# Patient Record
Sex: FEMALE | Race: WHITE | NOT HISPANIC OR LATINO | Employment: FULL TIME | URBAN - METROPOLITAN AREA
[De-identification: names, ages, dates, MRNs, and addresses within clinical notes are randomized per-mention and may not be internally consistent; named-entity substitution may affect disease eponyms.]

---

## 2023-02-01 ENCOUNTER — TRANSFERRED RECORDS (OUTPATIENT)
Dept: MULTI SPECIALTY CLINIC | Facility: CLINIC | Age: 43
End: 2023-02-01

## 2023-02-01 LAB — PAP SMEAR - HIM PATIENT REPORTED: NORMAL

## 2024-04-22 ENCOUNTER — OFFICE VISIT (OUTPATIENT)
Dept: FAMILY MEDICINE | Facility: CLINIC | Age: 44
End: 2024-04-22
Payer: COMMERCIAL

## 2024-04-22 ENCOUNTER — TELEPHONE (OUTPATIENT)
Dept: PLASTIC SURGERY | Facility: CLINIC | Age: 44
End: 2024-04-22

## 2024-04-22 ENCOUNTER — TELEPHONE (OUTPATIENT)
Dept: NEUROLOGY | Facility: CLINIC | Age: 44
End: 2024-04-22

## 2024-04-22 VITALS
HEART RATE: 105 BPM | WEIGHT: 126 LBS | OXYGEN SATURATION: 98 % | BODY MASS INDEX: 19.78 KG/M2 | RESPIRATION RATE: 16 BRPM | HEIGHT: 67 IN | TEMPERATURE: 98 F | DIASTOLIC BLOOD PRESSURE: 72 MMHG | SYSTOLIC BLOOD PRESSURE: 102 MMHG

## 2024-04-22 DIAGNOSIS — Z00.00 VISIT FOR PREVENTIVE HEALTH EXAMINATION: Primary | ICD-10-CM

## 2024-04-22 DIAGNOSIS — G45.9 TRANSIENT ISCHEMIC ATTACK (TIA): ICD-10-CM

## 2024-04-22 DIAGNOSIS — H91.90 HEARING LOSS, UNSPECIFIED HEARING LOSS TYPE, UNSPECIFIED LATERALITY: ICD-10-CM

## 2024-04-22 DIAGNOSIS — Z13.0 SCREENING FOR DEFICIENCY ANEMIA: ICD-10-CM

## 2024-04-22 DIAGNOSIS — Z13.29 SCREENING FOR THYROID DISORDER: ICD-10-CM

## 2024-04-22 DIAGNOSIS — Z13.220 LIPID SCREENING: ICD-10-CM

## 2024-04-22 DIAGNOSIS — Z13.1 SCREENING FOR DIABETES MELLITUS: ICD-10-CM

## 2024-04-22 DIAGNOSIS — F64.9 GENDER DYSPHORIA: ICD-10-CM

## 2024-04-22 LAB
ANION GAP SERPL CALCULATED.3IONS-SCNC: 10 MMOL/L (ref 7–15)
BUN SERPL-MCNC: 10 MG/DL (ref 6–20)
CALCIUM SERPL-MCNC: 9 MG/DL (ref 8.6–10)
CHLORIDE SERPL-SCNC: 105 MMOL/L (ref 98–107)
CHOLEST SERPL-MCNC: 198 MG/DL
CREAT SERPL-MCNC: 0.81 MG/DL (ref 0.51–1.17)
DEPRECATED HCO3 PLAS-SCNC: 24 MMOL/L (ref 22–29)
EGFRCR SERPLBLD CKD-EPI 2021: >90 ML/MIN/1.73M2
ERYTHROCYTE [DISTWIDTH] IN BLOOD BY AUTOMATED COUNT: 11.3 % (ref 10–15)
FASTING STATUS PATIENT QL REPORTED: YES
GLUCOSE SERPL-MCNC: 88 MG/DL (ref 70–99)
HCT VFR BLD AUTO: 39.4 % (ref 35–53)
HDLC SERPL-MCNC: 93 MG/DL
HGB BLD-MCNC: 13.5 G/DL (ref 11.7–17.7)
LDLC SERPL CALC-MCNC: 96 MG/DL
MCH RBC QN AUTO: 32.5 PG (ref 26.5–33)
MCHC RBC AUTO-ENTMCNC: 34.3 G/DL (ref 31.5–36.5)
MCV RBC AUTO: 95 FL (ref 78–100)
NONHDLC SERPL-MCNC: 105 MG/DL
PLATELET # BLD AUTO: 267 10E3/UL (ref 150–450)
POTASSIUM SERPL-SCNC: 4.5 MMOL/L (ref 3.4–5.3)
RBC # BLD AUTO: 4.15 10E6/UL (ref 3.8–5.9)
SODIUM SERPL-SCNC: 139 MMOL/L (ref 135–145)
TRIGL SERPL-MCNC: 47 MG/DL
TSH SERPL DL<=0.005 MIU/L-ACNC: 0.86 UIU/ML (ref 0.3–4.2)
WBC # BLD AUTO: 6.4 10E3/UL (ref 4–11)

## 2024-04-22 PROCEDURE — 90715 TDAP VACCINE 7 YRS/> IM: CPT | Performed by: FAMILY MEDICINE

## 2024-04-22 PROCEDURE — 85027 COMPLETE CBC AUTOMATED: CPT | Performed by: FAMILY MEDICINE

## 2024-04-22 PROCEDURE — 36415 COLL VENOUS BLD VENIPUNCTURE: CPT | Performed by: FAMILY MEDICINE

## 2024-04-22 PROCEDURE — 99213 OFFICE O/P EST LOW 20 MIN: CPT | Mod: 25 | Performed by: FAMILY MEDICINE

## 2024-04-22 PROCEDURE — 84443 ASSAY THYROID STIM HORMONE: CPT | Performed by: FAMILY MEDICINE

## 2024-04-22 PROCEDURE — 80048 BASIC METABOLIC PNL TOTAL CA: CPT | Performed by: FAMILY MEDICINE

## 2024-04-22 PROCEDURE — 90471 IMMUNIZATION ADMIN: CPT | Performed by: FAMILY MEDICINE

## 2024-04-22 PROCEDURE — 80061 LIPID PANEL: CPT | Performed by: FAMILY MEDICINE

## 2024-04-22 PROCEDURE — 99386 PREV VISIT NEW AGE 40-64: CPT | Mod: 25 | Performed by: FAMILY MEDICINE

## 2024-04-22 RX ORDER — ATORVASTATIN CALCIUM 20 MG/1
20 TABLET, FILM COATED ORAL DAILY
Qty: 90 TABLET | Refills: 3 | Status: SHIPPED | OUTPATIENT
Start: 2024-04-22

## 2024-04-22 RX ORDER — ASPIRIN 81 MG/1
81 TABLET ORAL DAILY
COMMUNITY

## 2024-04-22 SDOH — HEALTH STABILITY: PHYSICAL HEALTH: ON AVERAGE, HOW MANY DAYS PER WEEK DO YOU ENGAGE IN MODERATE TO STRENUOUS EXERCISE (LIKE A BRISK WALK)?: 0 DAYS

## 2024-04-22 SDOH — HEALTH STABILITY: PHYSICAL HEALTH: ON AVERAGE, HOW MANY MINUTES DO YOU ENGAGE IN EXERCISE AT THIS LEVEL?: 0 MIN

## 2024-04-22 ASSESSMENT — PAIN SCALES - GENERAL: PAINLEVEL: NO PAIN (0)

## 2024-04-22 ASSESSMENT — SOCIAL DETERMINANTS OF HEALTH (SDOH): HOW OFTEN DO YOU GET TOGETHER WITH FRIENDS OR RELATIVES?: ONCE A WEEK

## 2024-04-22 NOTE — PROGRESS NOTES
Assessment/Plan.    Preventive.  See below orders for screening tests and immunizations.  -Declines mammogram    Episodes of blurry vision, single episode of abnormal speech.  Diagnosed as TIA in [place], though character of episodes seems atypical for this.  Could be complex migraine.  -Continue aspirin and statin  -Refer to neurology.  I think patient would benefit from review of symptoms and previous imaging by neurologist.  If TIA/stroke, would likely benefit from additional cardiac workup    Gender dysphoria.  Interested in top surgery.  -Refer to plastic surgery    Bilateral hearing loss.  Reassuring ear exam.  -Refer to audiology    Follow-up annually.    Orders Placed This Encounter   Procedures    TDAP 7+ (ADACEL,BOOSTRIX)    Lipid panel reflex to direct LDL Fasting    Basic metabolic panel  (Ca, Cl, CO2, Creat, Gluc, K, Na, BUN)    CBC with platelets    TSH with free T4 reflex    Adult Neurology  Referral    Adult Plastic Surgery  Referral    Adult Audiology  Referral       ----  Chief complaint: Physical (Neuro ref requested)    Social History     Social History Narrative    -Grew up in [place].  Moved to [place] in 8/2023.    -Lives with female partner    -No kids    -Works remotely as [employer]     -Lived in [place] for 11 years    -Enjoys  [employer], board games, [employer]        Updated 4/23/2024     Patient has been advised of split billing: yes.    Neurologic episode in July 2021.  Was living in [facility] at the time.  Episode occurred soon after second dose of COVID vaccine.    Patient noted blurring of words on computer screen.  Then noted ability to read letters, but could not make sense of words.  Was able to speak, but used nonsensical words.  Patient cannot recall whether she was able to understand the speech of others during this time.  Episode lasted for less than an hour.    Patient has experienced 5 additional episodes since that time.  The most recent episode occurred in  "August 2023.  During these later episodes, patient has not noted any speech deficits.  The episodes typically involve blurry vision.  They last about 20 minutes.    No persistent deficits following episodes.    Patient denies experiencing headaches during these episodes.  She denies a history of migraines.    No history of DVT.    Following initial episode in July 2021, lesion was noted on brain MRI. Subsequent MRIs showed improvement in lesion.    Following initial episode, had EKG.  Does not recall rhythm monitor or echocardiogram.  Had carotid ultrasound.  Had ophthalmology consult.    Following initial episode, was started on aspirin 100 mg daily.  Has been taking 81 mg daily recently.  Was also started on Lipitor 20 mg daily, but ran out of this more than 6 months ago.      With regard to preventive care, denies STI concerns.  Denies pregnancy risk.  Patient reports being advised to not receive any further COVID vaccines due to possible TIA following second dose.  Patient feels that she received usual childhood vaccines in [place].  She had an abnormal Pap smear in her 20s, but subsequent Pap smears were normal.  Most recent Pap smear was about a year ago in [place].    Very occasionally smokes a cigarette.  Currently using nicotine patch.    Bilateral hearing loss.  Past 5 years.  More pronounced on right.  Most noticeable in noisy areas.  No recent tinnitus or loss of balance.  No history of ear surgery.  No family history of early-onset deafness.  No recent ear trauma.    Exam  /72 (BP Location: Right arm, Patient Position: Sitting, Cuff Size: Adult Regular)   Pulse 105   Temp 98  F (36.7  C) (Temporal)   Resp 16   Ht 1.7 m (5' 6.93\")   Wt 57.2 kg (126 lb)   SpO2 98%   BMI 19.78 kg/m      Tympanic membranes normal bilaterally  oropharynx without abnormal masses  No carotid bruit  lung fields CTAB  heart with regular rate and rhythm, no murmurs  no lower leg edema    PERRL, EOMI  Symmetric " facial movements  Symmetric sensation to light touch on face  Midline tongue protrusion and uvula elevation  Symmetric shoulder shrug    No pronator drift. 5/5 strength b/l with flexion/extension at elbows and wrists. Symmetric  strength.    5/5 strength b/l with flexion at hip, flexion & extension at knee, and dorsiflexion & plantarflexion at ankle

## 2024-04-22 NOTE — COMMUNITY RESOURCES LIST (ENGLISH)
April 22, 2024           YOUR PERSONALIZED LIST OF SERVICES & PROGRAMS           & RECREATION    Sports      [facility] [employer] - Walking Group  [address] [place] [address] (Distance: 1.3 miles)  Phone: [phone]  Language: English  Fee: Free  Accessibility: Ada accessible, Translation services      [place] [employer] - Sports clubs and recreational activities - [facility]  [address] [place] [address] (Distance: 1.5 miles)  Phone: [phone]  Language: English, Thai, Welsh  Fee: Free, Self pay  Accessibility: Ada accessible      [facility] - Adult Enrichment  Phone: [phone]  Website: [email or web address]  Language: English  Hours: Mon 7:30 AM - 4:00 PM Tue 7:30 AM - 4:00 PM Wed 7:30 AM - 4:00 PM Thu 7:30 AM - 4:00 PM Fri 7:30 AM - 4:00 PM    Classes/Groups      [facility] - Gym or workout facility  [address] [place] [address] (Distance: 3.4 miles)  Phone: [phone]  Language: English, Thai  Fee: Self pay  Accessibility: Ada accessible      [facility] Gym or workout [facility] - [facility]  [address] [place] [address] (Distance: 0.3 miles)  Language: English  Fee: Free, Self pay  Accessibility: Ada accessible      [facility] - Adult Enrichment  Phone: [phone]  Website: [email or web address]-[email or web address]  Language: English  Hours: Mon 7:30 AM - 4:00 PM Tue 7:30 AM - 4:00 PM Wed 7:30 AM - 4:00 PM Thu 7:30 AM - 4:00 PM Fri 7:30 AM - 4:00 PM               IMPORTANT NUMBERS & WEBSITES        Emergency Services  911  .   United Way  211 http://211unitedway.org  .   Poison Control  (325) 415-7787 http://[place]poison.org http://wis[place]poison.org  .     Suicide and  Crisis Lifeline  988 http://988lifeline.org  .   Childhelp [place] Child Abuse Hotline  805.697.8225 http://Childhelphotline.org   .   National Sexual Assault Hotline  (427) 395-7326 (HOPE) http://Rainn.org   .     National Runaway Safeline  (867) 148-5845 (RUNAWAY) http://HelloBooks.Moovweb  .   Pregnancy & Postpartum Support  Call/text 525-792-9003  [place]: http://ppsupport[place].org  [place]: http://psichapters.com/wi  .   Substance Abuse National Helpline ([facility])  731-235-HELP (4846) http://Findtreatment.gov   .                DISCLAIMER: These resources have been generated via the [employer] Platform. [employer] does not endorse any service providers mentioned in this resource list. [employer] does not guarantee that the services mentioned in this resource list will be available to you or will improve your health or wellness.    [facility]

## 2024-04-22 NOTE — PROGRESS NOTES
Preventive Care Visit  [facility] INTEGRATED PRIMARY CARE  Thaddeus Gutierrez MD, Family Medicine  Apr 22, 2024  {Provider  Link to SmartSet :466096}    {PROVIDER CHARTING PREFERENCE:749848}    Subjective   AJ is a 43 year old, presenting for the following:  Physical (Neuro ref requested)        4/22/2024    10:11 AM   Additional Questions   Roomed by [facility]        Via the Health Maintenance questionnaire, the patient has reported the following services have been completed -Cervical Cancer Screening, this information has been sent to the abstraction team.  Health Care Directive  Patient does not have a Health Care Directive or Living Will: {ADVANCE_DIRECTIVE_STATUS:320673}    HPI  ***  {MA/LPN/RN Pre-Provider Visit Orders- hCG/UA/Strep (Optional):580764}  {SUPERLIST (Optional):538552}  {additonal problems for provider to add (Optional):668610}        4/22/2024   General Health   How would you rate your overall physical health? Good   Feel stress (tense, anxious, or unable to sleep) Only a little   (!) STRESS CONCERN      4/22/2024   Nutrition   Three or more servings of calcium each day? Yes   Diet: Regular (no restrictions)   How many servings of fruit and vegetables per day? (!) 2-3   How many sweetened beverages each day? (!) 2         4/22/2024   Exercise   Days per week of moderate/strenous exercise 0 days   Average minutes spent exercising at this level 0 min   (!) EXERCISE CONCERN      4/22/2024   Social Factors   Frequency of gathering with friends or relatives Once a week   Worry food won't last until get money to buy more No   Food not last or not have enough money for food? No   Do you have housing?  Yes   Are you worried about losing your housing? No   Lack of transportation? No   Unable to get utilities (heat,electricity)? No         4/22/2024   Dental   Dentist two times every year? (!) NO         4/22/2024   TB Screening   Were you born outside of the [place]? No         Today's PHQ-2  Score:       4/22/2024     9:08 AM   PHQ-2 ( 1999 Pfizer)   Q1: Little interest or pleasure in doing things 0   Q2: Feeling down, depressed or hopeless 0   PHQ-2 Score 0   Q1: Little interest or pleasure in doing things Not at all   Q2: Feeling down, depressed or hopeless Not at all   PHQ-2 Score 0           4/22/2024   Substance Use   Alcohol more than 3/day or more than 7/wk Yes   How often do you have a drink containing alcohol 4 or more times a week   How many alcohol drinks on typical day 1 or 2   How often do you have 5+ drinks at one occasion Never   Audit 2/3 Score 0   How often not able to stop drinking once started Never   How often failed to do what normally expected Never   How often needed first drink in am after a heavy drinking session Never   How often feeling of guilt or remorse after drinking Never   How often unable to remember what happened the night before Never   Have you or someone else been injured because of your drinking No   Has anyone been concerned or suggested you cut down on drinking No   TOTAL SCORE - AUDIT 4   Do you use any other substances recreationally? (!) ALCOHOL    (!) CANNABIS PRODUCTS     Social History     Tobacco Use    Smoking status: Never    Smokeless tobacco: Never     {Provider  If there are gaps in the social history shown above, please follow the link to update and then refresh the note Link to Social and Substance History :747530}        4/22/2024   Breast Cancer Screening   Family history of breast, colon, or ovarian cancer? Yes         4/22/2024   LAST FHS-7 RESULTS   1st degree relative breast or ovarian cancer No   Any relative bilateral breast cancer No   Any male have breast cancer No   Any ONE woman have BOTH breast AND ovarian cancer No   Any woman with breast cancer before 50yrs No   2 or more relatives with breast AND/OR ovarian cancer No   2 or more relatives with breast AND/OR bowel cancer No     {If any of the questions to the FHS7 are answered yes,  "consider referral for genetic counseling.    Additional indications for genetic referral include personal history of breast or ovarian cancer, genetic mutation in 1st degree relative which increases risk of breast cancer including BRCA1, BRCA2, ANTONY, PALB 2, TP53, CHEK2, PTEN, CDH1, STK11 (per ACS) and/or 1st degree relative with history of pancreatic or high-risk prostate cancer (per NCCN):930787}   {Mammogram Decision Support (Optional):618769}          4/22/2024   One time HIV Screening   Previous HIV test? Yes         4/22/2024   STI Screening   New sexual partner(s) since last STI/HIV test? No     History of abnormal Pap smear: { :427806}       ASCVD Risk   The ASCVD Risk score (Surjit DIOR, et al., 2019) failed to calculate for the following reasons:    Cannot find a previous HDL lab    Cannot find a previous total cholesterol lab    The BP treatment status is invalid        4/22/2024   Contraception/Family Planning   Questions about contraception or family planning No     {Provider  Use the storyboard to review patient history, after sections have been marked as reviewed, refresh note to capture documentation:356514}   Reviewed and updated as needed this visit by Provider                    {HISTORY OPTIONS (Optional):974854}    {ROS Picklists (Optional):297508}     Objective    Exam  /72 (BP Location: Right arm, Patient Position: Sitting, Cuff Size: Adult Regular)   Pulse 105   Temp 98  F (36.7  C) (Temporal)   Resp 16   Ht 1.7 m (5' 6.93\")   Wt 57.2 kg (126 lb)   SpO2 98%   BMI 19.78 kg/m     Estimated body mass index is 19.78 kg/m  as calculated from the following:    Height as of this encounter: 1.7 m (5' 6.93\").    Weight as of this encounter: 57.2 kg (126 lb).    Physical Exam  {Exam Choices (Optional):087214}        Signed Electronically by: Thaddeus Gutierrez MD  {Email feedback regarding this note to [email or web address]   :503535}  "

## 2024-04-22 NOTE — TELEPHONE ENCOUNTER
[facility] Call Center    Phone Message    May a detailed message be left on voicemail: yes     Reason for Call: Appointment Intake    Referring Provider Name: Thaddeus Gutierrez MD  Diagnosis and/or Symptoms: TIA (transient ischemic attack) [G45.9]    Please review referral, per patient she was not diagnosis with a TIA 3 years ago, patient was offered aapt on 5/7/24 with Dr. Langford but was not sure she had a stroke. Please review and assist with scheduling. Writer unsure how to schedule    Action Taken: Message routed to:  [facility] Neurology    Travel Screening: Not Applicable

## 2024-04-23 PROBLEM — F64.9 GENDER DYSPHORIA: Status: ACTIVE | Noted: 2024-04-23

## 2024-04-23 PROBLEM — H91.90 HEARING LOSS, UNSPECIFIED HEARING LOSS TYPE, UNSPECIFIED LATERALITY: Status: ACTIVE | Noted: 2024-04-23

## 2024-04-23 NOTE — TELEPHONE ENCOUNTER
Left Voicemail (1st Attempt) and Sent Mychart (1st Attempt) for the patient to call back and schedule the following:    Appointment type: NEW NEUROLOGY   Provider: any general neurology provider   Return date: first available   Specialty phone number: [phone]  Additional appointment(s) needed: n/a  Additonal Notes: IB message schedule request.        Fela Gracia on 4/23/2024 at 9:58 AM

## 2024-05-19 ENCOUNTER — HEALTH MAINTENANCE LETTER (OUTPATIENT)
Age: 44
End: 2024-05-19

## 2024-06-12 ENCOUNTER — OFFICE VISIT (OUTPATIENT)
Dept: AUDIOLOGY | Facility: CLINIC | Age: 44
End: 2024-06-12
Attending: FAMILY MEDICINE
Payer: COMMERCIAL

## 2024-06-12 DIAGNOSIS — H91.90 HEARING LOSS, UNSPECIFIED HEARING LOSS TYPE, UNSPECIFIED LATERALITY: ICD-10-CM

## 2024-06-12 DIAGNOSIS — Z01.10 EXAMINATION OF EARS AND HEARING: ICD-10-CM

## 2024-06-12 DIAGNOSIS — H93.293 ABNORMAL AUDITORY PERCEPTION OF BOTH EARS: Primary | ICD-10-CM

## 2024-06-12 PROCEDURE — 92550 TYMPANOMETRY & REFLEX THRESH: CPT | Performed by: AUDIOLOGIST

## 2024-06-12 PROCEDURE — 92557 COMPREHENSIVE HEARING TEST: CPT | Performed by: AUDIOLOGIST

## 2024-06-12 NOTE — PROGRESS NOTES
AUDIOLOGY REPORT    SUBJECTIVE:  Estefany Covington is a 43 year old adult who was seen in the Audiology Clinic at the [facility] for audiologic evaluation, referred by Thaddeus Gutierrez M.D. The patient reports an increased difficulty and frustration with trying to listen in certain situations. The patient denies  bilateral tinnitus, bilateral otalgia, bilateral drainage, bilateral aural fullness, and dizziness. They report a history of ear infections as child but deny any ear surgeries. No history of noise exposure reported.  The patient notes difficulty with communication in a variety of listening situations, largely when a competing signal is present.    OBJECTIVE:    Fall Risk Screen:  1. Have you fallen two or more times in the past year? No  2. Have you fallen and had an injury in the past year? No    Timed Up and Go Score (in seconds): not tested  Is patient a fall risk? No  Referral initiated: No  Fall Risk Assessment Completed by Audiology    Otoscopic exam indicates ears are clear of cerumen bilaterally     Pure Tone Thresholds assessed using conventional audiometry with good  reliability from 250-8000 Hz bilaterally using circumaural headphones     RIGHT:  Normal hearing sensitivity    LEFT:    Normal hearing sensitivity    Tympanogram:    RIGHT: normal eardrum mobility    LEFT:   normal eardrum mobility    Reflexes (reported by stimulus ear):  RIGHT: Ipsilateral is present at normal levels  RIGHT: Contralateral is present at normal levels  LEFT:   Ipsilateral is present at normal levels  LEFT:   Contralateral is present at normal levels    Speech Reception Threshold:    RIGHT: 10 dB HL    LEFT:   10 dB HL    Word Recognition Score:     RIGHT: 100% at 50 dB HL using NU-6 recorded word list.    LEFT:   100% at 50 dB HL using NU-6 recorded word list.      ASSESSMENT:     ICD-10-CM    1. Hearing loss, unspecified hearing loss type, unspecified laterality  H91.90 Adult  Audiology  Referral         Today's results indicate normal hearing sensitivity bilaterally. Today s results were discussed with the patient in detail.     PLAN:  Patient was counseled regarding hearing loss and impact on communication.  Good communication strategies were briefly reviewed with AJ.  It is recommended that the patient repeat the hearing evaluation in about 3 years, sooner if concerns arise.  Please call this clinic with questions regarding these results or recommendations.        Jasper Bronson  Audiologist  [place] License  #[id number]

## 2024-06-19 ENCOUNTER — OFFICE VISIT (OUTPATIENT)
Dept: FAMILY MEDICINE | Facility: CLINIC | Age: 44
End: 2024-06-19
Payer: COMMERCIAL

## 2024-06-19 VITALS
DIASTOLIC BLOOD PRESSURE: 66 MMHG | RESPIRATION RATE: 16 BRPM | OXYGEN SATURATION: 97 % | SYSTOLIC BLOOD PRESSURE: 101 MMHG | WEIGHT: 123 LBS | BODY MASS INDEX: 19.3 KG/M2 | TEMPERATURE: 98 F | HEIGHT: 67 IN | HEART RATE: 81 BPM

## 2024-06-19 DIAGNOSIS — R10.84 ABDOMINAL PAIN, GENERALIZED: Primary | ICD-10-CM

## 2024-06-19 PROCEDURE — 99213 OFFICE O/P EST LOW 20 MIN: CPT | Performed by: FAMILY MEDICINE

## 2024-06-19 NOTE — PROGRESS NOTES
Assessment & Plan     (R10.84) Abdominal pain, generalized  (primary encounter diagnosis)    EHR reviewed.   Past medical history, problem list, past surgical history, family history, social history, medications reviewed, updated, reconciled.   No signs of acute abdomen.   Vital signs are normal.   We reviewed possible etiology including viral, bacterial, gastritis.   Patient notes the pain is rather mild and improving already, but did offer evaluation and treatment options. AJ defers for now and will just monitor. If there is persistence of change in symptoms AJ will call or be seen.         Subjective   ELIER is a 43 year old, presenting for the following health issues:  Abdominal Pain (Since Saturday, off and on pain )      6/19/2024     1:03 PM   Additional Questions   Roomed by Tia     History of Present Illness       Reason for visit:  Stomach ache  Symptom onset:  3-7 days ago    AJ eats 2-3 servings of fruits and vegetables daily.AJ consumes 1 sweetened beverage(s) daily.AJ exercises with enough effort to increase AJ's heart rate 9 or less minutes per day.  AJ exercises with enough effort to increase AJ's heart rate 3 or less days per week.   AJ is taking medications regularly.     AJ is here today concerned about a stomach ache. This started may be three to four days ago. It is slowly improving. The pain is mostly in the lower part of the abdomen. Was a little worried about what it could be because when they lived in [place], had some bouts of traveller's diarrhea and pain. There is no recent travel. No recent antibiotic use. The pain does not radiate. Not associated nausea, vomiting or diarrhea, fever or chills. Has not needed any medications. Is not aware of any sick contacts.     Past Medical History:   Diagnosis Date    Tear meniscus knee, bilateral      Past Surgical History:   Procedure Laterality Date    COLONOSCOPY      benign polyp removal, in [place], around 2009    EYE SURGERY Bilateral     ICL lens  implantation, in [place]    KNEE ARTHROSCOPY W/ DEBRIDEMENT Left 1992    MARSUPIALIZATION BARTHOLIN CYST  2004    x2     Family History   Problem Relation Age of Onset    Cerebrovascular Disease Father     Breast Cancer Paternal Aunt     Ovarian Cancer No family hx of     Colorectal Cancer No family hx of      Social History     Socioeconomic History    Marital status: Single     Spouse name: Not on file    Number of children: Not on file    Years of education: Not on file    Highest education level: Not on file   Occupational History    Not on file   Tobacco Use    Smoking status: Never    Smokeless tobacco: Never   Vaping Use    Vaping status: Every Day    Substances: THC   Substance and Sexual Activity    Alcohol use: Yes     Alcohol/week: 7.0 - 14.0 standard drinks of alcohol     Types: 7 - 14 Standard drinks or equivalent per week    Drug use: Not Currently    Sexual activity: Not on file   Other Topics Concern    Not on file   Social History Narrative    -Grew up in [place].  Moved to [place] in 8/2023.    -Lives with female partner    -No kids    -Works remotely as [email or web address]     -Lived in [place] for 11 years    -Enjoys  [employer], [employer]        Updated 4/23/2024     Social Determinants of Health     Financial Resource Strain: Low Risk  (4/22/2024)    Financial Resource Strain     Within the past 12 months, have you or your family members you live with been unable to get utilities (heat, electricity) when it was really needed?: No   Food Insecurity: Low Risk  (4/22/2024)    Food Insecurity     Within the past 12 months, did you worry that your food would run out before you got money to buy more?: No     Within the past 12 months, did the food you bought just not last and you didn t have money to get more?: No   Transportation Needs: Low Risk  (4/22/2024)    Transportation Needs     Within the past 12 months, has lack of transportation kept you from medical appointments, getting your medicines,  "non-medical meetings or appointments, work, or from getting things that you need?: No   Physical Activity: Inactive (4/22/2024)    Exercise Vital Sign     Days of Exercise per Week: 0 days     Minutes of Exercise per Session: 0 min   Stress: No Stress Concern Present (4/22/2024)    [employer] - Occupational Stress Questionnaire     Feeling of Stress : Only a little   Social Connections: Unknown (4/22/2024)    Social Connection and Isolation Panel [NHANES]     Frequency of Communication with Friends and Family: Not on file     Frequency of Social Gatherings with Friends and Family: Once a week     Attends Caodaism Services: Not on file     Active Member of Clubs or Organizations: Not on file     Attends Club or Organization Meetings: Not on file     Marital Status: Not on file   Interpersonal Safety: Low Risk  (4/22/2024)    Interpersonal Safety     Do you feel physically and emotionally safe where you currently live?: Yes     Within the past 12 months, have you been hit, slapped, kicked or otherwise physically hurt by someone?: No     Within the past 12 months, have you been humiliated or emotionally abused in other ways by your partner or ex-partner?: No   Housing Stability: Low Risk  (4/22/2024)    Housing Stability     Do you have housing? : Yes     Are you worried about losing your housing?: No             Objective    /66 (BP Location: Left arm, Patient Position: Sitting, Cuff Size: Adult Regular)   Pulse 81   Temp 98  F (36.7  C)   Resp 16   Ht 1.7 m (5' 6.93\")   Wt 55.8 kg (123 lb)   LMP 05/26/2024 (Exact Date)   SpO2 97%   BMI 19.31 kg/m    Body mass index is 19.31 kg/m .  Physical Exam   GENERAL: alert and no distress  EYES: Eyes grossly normal to inspection, PERRL and conjunctivae and sclerae normal  NECK: no adenopathy, no asymmetry, masses, or scars  RESP: lungs clear to auscultation - no rales, rhonchi or wheezes  CV: regular rate and rhythm, normal S1 S2, no " S3 or S4, no murmur, click or rub, no peripheral edema  ABDOMEN: soft, nontender, no hepatosplenomegaly, no masses and bowel sounds normal, no rebound or guarding  MS: no gross musculoskeletal defects noted, no edema  PSYCH: mentation appears normal, affect normal/bright    No results found for any visits on 06/19/24.      Prior to immunization administration, verified patients identity using patient s name and date of birth. Please see Immunization Activity for additional information.     Screening Questionnaire for Adult Immunization    Are you sick today?   Don't Know   Do you have allergies to medications, food, a vaccine component or latex?   No   Have you ever had a serious reaction after receiving a vaccination?   Don't Know   Do you have a long-term health problem with heart, lung, kidney, or metabolic disease (e.g., diabetes), asthma, a blood disorder, no spleen, complement component deficiency, a cochlear implant, or a spinal fluid leak?  Are you on long-term aspirin therapy?   No   Do you have cancer, leukemia, HIV/AIDS, or any other immune system problem?   No   Do you have a parent, brother, or sister with an immune system problem?   No   In the past 3 months, have you taken medications that affect  your immune system, such as prednisone, other steroids, or anticancer drugs; drugs for the treatment of rheumatoid arthritis, Crohn s disease, or psoriasis; or have you had radiation treatments?   No   Have you had a seizure, or a brain or other nervous system problem?   Don't know   During the past year, have you received a transfusion of blood or blood    products, or been given immune (gamma) globulin or antiviral drug?   No   For women: Are you pregnant or is there a chance you could become       pregnant during the next month?   No   Have you received any vaccinations in the past 4 weeks?   No     Immunization questionnaire was positive for at least one answer.  Notified .      Patient instructed  to remain in clinic for 15 minutes afterwards, and to report any adverse reactions.     Screening performed by Beulah Cordero CMA on 6/19/2024 at 1:05 PM.        Signed Electronically by: Andrea Ring MD

## 2024-07-19 NOTE — TELEPHONE ENCOUNTER
RECORDS RECEIVED FROM: Internal    REASON FOR VISIT: TIA vs atypical migraine, last episode >6 months ago   PROVIDER: Eliceo Smith MD   DATE OF APPT: 9/20/24 @ 4:15 pm    NOTES (FOR ALL VISITS) STATUS DETAILS   OFFICE NOTE from referring provider Internal 4/22/24 Thaddeus Gutierrez MD @[facility]     MEDICATION LIST Internal    IMAGING  (FOR ALL VISITS)     MRI (HEAD, NECK, SPINE) N/A    CT (HEAD, NECK, SPINE) N/A

## 2024-07-28 ENCOUNTER — NURSE TRIAGE (OUTPATIENT)
Dept: NURSING | Facility: CLINIC | Age: 44
End: 2024-07-28
Payer: COMMERCIAL

## 2024-07-28 NOTE — TELEPHONE ENCOUNTER
Nurse Triage SBAR    Is this a 2nd Level Triage? NO    Situation:  Eye problem    Background:  Pt reports she got a piece of plaster in her eye a few hrs ago. Reports she rinsed it out and then had to use a qtip to get the rest of the fragments out.     Assessment:  Reports some mild discomfort stating the pain is more on the eyelid then the eye. States she doesn't think her pupil is dilating normally. Reports she is now experiencing blurred vision in the affected eye.     Protocol Recommended Disposition:   Go to ED Now    Recommendation:  Go to ED now. Protocol and care advice reviewed. Advised to call back with any new or worsening signs, symptoms, concerns, or questions. They verbalized understanding and agreed to follow advice given.     Reason for Disposition   [1] Blurred vision AND [2] persists > 1 hour since irrigation (regardless of duration of flushing)    Additional Information   Negative: Foreign body (FB) stuck on eyeball  (Exception: Contact lens.)   Negative: [1] Sharp FB (even if FB was removed) AND [2] any pain present now   Negative: FB hit eye at high speed (e.g., small metallic chip from hammering, lawnmower, BB gun, explosion)   Negative: [1] Eye has been washed out > 30 minutes ago AND [2] feels like FB is still present   Negative: [1] Eye pain AND [2] persists > 1 hour since irrigation (regardless of duration of flushing)   Negative: [1] Tearing or blinking AND [2] persists > 1 hour since irrigation (regardless of duration of flushing)    Protocols used: Eye - Foreign Body-A-    Mona Starks RN on 7/28/2024 at 5:38 PM

## 2024-09-20 ENCOUNTER — OFFICE VISIT (OUTPATIENT)
Dept: NEUROLOGY | Facility: CLINIC | Age: 44
End: 2024-09-20
Payer: COMMERCIAL

## 2024-09-20 ENCOUNTER — PRE VISIT (OUTPATIENT)
Dept: NEUROLOGY | Facility: CLINIC | Age: 44
End: 2024-09-20

## 2024-09-20 VITALS
HEART RATE: 90 BPM | OXYGEN SATURATION: 98 % | RESPIRATION RATE: 16 BRPM | SYSTOLIC BLOOD PRESSURE: 116 MMHG | DIASTOLIC BLOOD PRESSURE: 75 MMHG

## 2024-09-20 DIAGNOSIS — R29.90 EPISODE OF TRANSIENT NEUROLOGIC SYMPTOMS: Primary | ICD-10-CM

## 2024-09-20 PROCEDURE — 99204 OFFICE O/P NEW MOD 45 MIN: CPT | Performed by: INTERNAL MEDICINE

## 2024-09-20 ASSESSMENT — PAIN SCALES - GENERAL: PAINLEVEL: NO PAIN (0)

## 2024-09-20 NOTE — LETTER
9/20/2024       RE: Estefany Covington  [address]  [facility] [address]     Dear Colleague,    Thank you for referring your patient, Estefany Covington, to the [facility] [place] [facility]. Please see a copy of my visit note below.    [facility] Neurology Consultation    Estefany Covington MRN# [id number]   Age: 43 year old YOB: 1980     Requesting physician: Thaddeus Gutierrez  No Ref-Primary, Physician     Reason for Consultation: episodic neurological symptoms           Assessment and Plan:   Assessment:  Estefany Covington is a 43 year old adult who presents today for evaluation of episodic neurological symptoms. Starting in July 2021 patient has had episodes of blurry vision with likely aphasia. She previously had work-up for these episodes in [place] and was recommended to start aspirin/Lipitor for possible TIA. MRI imaging commented on left periventricular hyperintensity that has been stable on subsequent imaging. Given that these episodes have occurred multiple times and she does not have a focal stenosis on MRA head imaging, I suspect that episodes are most likely complicated migraine. Patient will return to clinic next week for appointment to review images, which she is able to view on her computer. If this is not possible we will repeat MRI imaging.      Plan:  - Review prior MRI brain imaging at follow-up appointment    Eliceo Smith MD   of Neurology  [facility]  ---------------------------------------------------------------------------------------------------------------------------        History of Presenting Symptoms:   Estefany Covington is a 43 year old adult who presents today for evaluation of episodic neurological symptoms.     Patient moved to [place] a year ago. Patient lived in [place] prior to this. In July 2021 patient had a stroke like experience. Patient was at work reading at  "the time. Suddenly patient had a problems reading. Her vision seemed to go blurry. Patient would tried to read things and couldn't comprehend anything. Patient walked around to try to relax. Patient had no problems talking. Patient tried to text a friend and couldn't come up with the words. Patient tried to speak aloud and \"weird syllables\" came out of her mouth. This continued for about an hour, but then she felt ok. The next day patient saw a doctor who ordered an MRI. Patient saw a neurologist. There was a little bit of \"brain damage\" on the scan. They did many blood tests. The cholesterol was a little elevated.     Patient had 3-4 more minor episodes where it seemed like it was the same experience. The last time was a year ago and each lasted about 20 minutes. These episodes were preceded by blurry vision and included more difficulties with reading. Patient could read with more concentration. Patient's partner was there for at least one of the episodes and didn't notice anything. Patient was able to speak ok during this episode.        Past Medical History:     Patient Active Problem List   Diagnosis     Transient ischemic attack (TIA)     Hearing loss, unspecified hearing loss type, unspecified laterality     Gender dysphoria     Past Medical History:   Diagnosis Date     Tear meniscus knee, bilateral         Past Surgical History:     Past Surgical History:   Procedure Laterality Date     COLONOSCOPY      benign polyp removal, in [place], around 2009     EYE SURGERY Bilateral     ICL lens implantation, in [place]     KNEE ARTHROSCOPY W/ DEBRIDEMENT Left 1992     MARSUPIALIZATION BARTHOLIN CYST  2004    x2        Social History:     Social History     Tobacco Use     Smoking status: Never     Smokeless tobacco: Never   Vaping Use     Vaping status: Every Day     Substances: THC   Substance Use Topics     Alcohol use: Yes     Alcohol/week: 7.0 - 14.0 standard drinks of alcohol     Types: 7 - 14 Standard drinks or " equivalent per week     Drug use: Not Currently        Family History:     Family History   Problem Relation Age of Onset     Cerebrovascular Disease Father      Breast Cancer Paternal Aunt      Ovarian Cancer No family hx of      Colorectal Cancer No family hx of         Medications:     Current Outpatient Medications   Medication Sig Dispense Refill     aspirin 81 MG EC tablet Take 81 mg by mouth daily       atorvastatin (LIPITOR) 20 MG tablet Take 1 tablet (20 mg) by mouth daily 90 tablet 3     NICOTINE TD        No current facility-administered medications for this visit.        Allergies:   No Known Allergies     Review of Systems:   As above     Physical Exam:   Vitals: /75   Pulse 90   Resp 16   SpO2 98%    General: Seated comfortably in no acute distress.  HEENT: Optic discs sharp on funduscopic exam.   Lungs: breathing comfortably  Neurologic:     Mental Status: Fully alert, attentive. Language normal, speech clear and fluent, no paraphasic errors.      Cranial Nerves: Visual fields intact. PERRL. EOMI with normal smooth pursuit. Facial sensation intact/symmetric. Facial movements symmetric. Hearing not formally tested but intact to conversation. Palate elevation symmetric, uvula midline. No dysarthria. Shoulder shrug strong bilaterally. Tongue protrusion midline.     Motor: No tremors or other abnormal movements observed. Muscle tone normal throughout. No pronator drift. Normal/symmetric rapid finger tapping. Strength 5/5 throughout upper and lower extremities.      Right Left   Shoulder abduction        5 5   Elbow extension 5 5   Elbow flexion 5 5   Wrist extension         5 5   Finger extension 5 5   ADM 5 5   FDI 5 5   APB 5 5   Hip flexion 5 5   Knee flexion 5 5   Knee extension 5 5   Dorsiflexion 5 5   Plantar flexion 5 5        Deep Tendon Reflexes: 2+/symmetric throughout upper and lower extremities. No clonus. Toes downgoing bilaterally.      Right Left   Biceps 2 2   BRD 2 2   Triceps 2  2   Rachid 2 2   Patellar 2 2   Achilles 2 2   Plantar Flexor Flexor   Clonus Absent Absent        Sensory: Intact/symmetric to light touch, pinprick, temperature, vibration and proprioception throughout upper and lower extremities. Negative Romberg.      Coordination: Finger-nose-finger and heel-shin intact without dysmetria. Rapid alternating movements intact/symmetric with normal speed and rhythm.     Gait: Normal, steady casual gait. Able to walk on toes, heels and tandem without difficulty.         Data: Pertinent prior to visit   Imaging:  At least 3 MRI brain reports reviewed (with and without contrast most recently 2/2023) commented on left sided 7.5 mm hyperintensity in the left insula / periventricular area    MRA brain report without stenosis      Again, thank you for allowing me to participate in the care of your patient.      Sincerely,    Eliceo Smith MD

## 2024-09-20 NOTE — PROGRESS NOTES
[facility] Neurology Consultation    Estefany Covington MRN# [id number]   Age: 43 year old YOB: 1980     Requesting physician: Thaddeus Gutierrez  No Ref-Primary, Physician     Reason for Consultation: episodic neurological symptoms           Assessment and Plan:   Assessment:  Estefany Covington is a 43 year old adult who presents today for evaluation of episodic neurological symptoms. Starting in July 2021 patient has had episodes of blurry vision with likely aphasia. She previously had work-up for these episodes in [place] and was recommended to start aspirin/Lipitor for possible TIA. MRI imaging commented on left periventricular hyperintensity that has been stable on subsequent imaging. Given that these episodes have occurred multiple times and she does not have a focal stenosis on MRA head imaging, I suspect that episodes are most likely complicated migraine. Patient will return to clinic next week for appointment to review images, which she is able to view on her computer. If this is not possible we will repeat MRI imaging.      Plan:  - Review prior MRI brain imaging at follow-up appointment    Eliceo Smith MD   of Neurology  [facility]  ---------------------------------------------------------------------------------------------------------------------------        History of Presenting Symptoms:   Estefany Covington is a 43 year old adult who presents today for evaluation of episodic neurological symptoms.     Patient moved to [place] a year ago. Patient lived in [place] prior to this. In July 2021 patient had a stroke like experience. Patient was at work reading at the time. Suddenly patient had a problems reading. Her vision seemed to go blurry. Patient would tried to read things and couldn't comprehend anything. Patient walked around to try to relax. Patient had no problems talking. Patient tried to text a friend and couldn't come up with the words. Patient tried to speak aloud  "and \"weird syllables\" came out of her mouth. This continued for about an hour, but then she felt ok. The next day patient saw a doctor who ordered an MRI. Patient saw a neurologist. There was a little bit of \"brain damage\" on the scan. They did many blood tests. The cholesterol was a little elevated.     Patient had 3-4 more minor episodes where it seemed like it was the same experience. The last time was a year ago and each lasted about 20 minutes. These episodes were preceded by blurry vision and included more difficulties with reading. Patient could read with more concentration. Patient's partner was there for at least one of the episodes and didn't notice anything. Patient was able to speak ok during this episode.        Past Medical History:     Patient Active Problem List   Diagnosis    Transient ischemic attack (TIA)    Hearing loss, unspecified hearing loss type, unspecified laterality    Gender dysphoria     Past Medical History:   Diagnosis Date    Tear meniscus knee, bilateral         Past Surgical History:     Past Surgical History:   Procedure Laterality Date    COLONOSCOPY      benign polyp removal, in [place], around 2009    EYE SURGERY Bilateral     ICL lens implantation, in [place]    KNEE ARTHROSCOPY W/ DEBRIDEMENT Left 1992    MARSUPIALIZATION BARTHOLIN CYST  2004    x2        Social History:     Social History     Tobacco Use    Smoking status: Never    Smokeless tobacco: Never   Vaping Use    Vaping status: Every Day    Substances: THC   Substance Use Topics    Alcohol use: Yes     Alcohol/week: 7.0 - 14.0 standard drinks of alcohol     Types: 7 - 14 Standard drinks or equivalent per week    Drug use: Not Currently        Family History:     Family History   Problem Relation Age of Onset    Cerebrovascular Disease Father     Breast Cancer Paternal Aunt     Ovarian Cancer No family hx of     Colorectal Cancer No family hx of         Medications:     Current Outpatient Medications   Medication Sig " Dispense Refill    aspirin 81 MG EC tablet Take 81 mg by mouth daily      atorvastatin (LIPITOR) 20 MG tablet Take 1 tablet (20 mg) by mouth daily 90 tablet 3    NICOTINE TD        No current facility-administered medications for this visit.        Allergies:   No Known Allergies     Review of Systems:   As above     Physical Exam:   Vitals: /75   Pulse 90   Resp 16   SpO2 98%    General: Seated comfortably in no acute distress.  HEENT: Optic discs sharp on funduscopic exam.   Lungs: breathing comfortably  Neurologic:     Mental Status: Fully alert, attentive. Language normal, speech clear and fluent, no paraphasic errors.      Cranial Nerves: Visual fields intact. PERRL. EOMI with normal smooth pursuit. Facial sensation intact/symmetric. Facial movements symmetric. Hearing not formally tested but intact to conversation. Palate elevation symmetric, uvula midline. No dysarthria. Shoulder shrug strong bilaterally. Tongue protrusion midline.     Motor: No tremors or other abnormal movements observed. Muscle tone normal throughout. No pronator drift. Normal/symmetric rapid finger tapping. Strength 5/5 throughout upper and lower extremities.      Right Left   Shoulder abduction        5 5   Elbow extension 5 5   Elbow flexion 5 5   Wrist extension         5 5   Finger extension 5 5   ADM 5 5   FDI 5 5   APB 5 5   Hip flexion 5 5   Knee flexion 5 5   Knee extension 5 5   Dorsiflexion 5 5   Plantar flexion 5 5        Deep Tendon Reflexes: 2+/symmetric throughout upper and lower extremities. No clonus. Toes downgoing bilaterally.      Right Left   Biceps 2 2   BRD 2 2   Triceps 2 2   Rachid 2 2   Patellar 2 2   Achilles 2 2   Plantar Flexor Flexor   Clonus Absent Absent        Sensory: Intact/symmetric to light touch, pinprick, temperature, vibration and proprioception throughout upper and lower extremities. Negative Romberg.      Coordination: Finger-nose-finger and heel-shin intact without dysmetria. Rapid  alternating movements intact/symmetric with normal speed and rhythm.     Gait: Normal, steady casual gait. Able to walk on toes, heels and tandem without difficulty.         Data: Pertinent prior to visit   Imaging:  At least 3 MRI brain reports reviewed (with and without contrast most recently 2/2023) commented on left sided 7.5 mm hyperintensity in the left insula / periventricular area    MRA brain report without stenosis

## 2024-09-26 NOTE — PROGRESS NOTES
[facility] Neurology Follow Up Visit    Estefany Covington MRN# [id number]   Age: 43 year old YOB: 1980     Brief history of symptoms: The patient was initially seen in neurologic consultation on 9/20/2024 for evaluation of episodic neurological symptoms. Please see the comprehensive neurologic consultation note from that date in the Epic records for details.          Assessment and Plan:   Assessment:  Estefany Covington is a 43 year old adult who presents today for follow-up of episodic neurological symptoms and abnormal MRI brain. Starting in July 2021 patient has had episodes of blurry vision with likely aphasia. She previously had work-up for these episodes in [place] and was recommended to start aspirin/Lipitor for possible TIA. MRI brain was reviewed today which shows incidental small non specific left periventricular/insular hyperintensity that was stable on subsequent imaging. We discussed doing carotid ultrasound to do basic TIA work-up. Given low likelihood of TIA we can forgo TTE and cardiac monitoring at this time. If ultrasound is unremarkable patient can discontinue aspirin/Lipitor. Patient also reports family history of seizures and it would be reasonable to pursue EEG. I am most suspicious of these episodes representing complicated migraines.        Plan:  - Carotid ultrasound  - EEG 3 hours    Follow up in Neurology clinic pending above    Eliceo Smith MD   of Neurology  [facility]  ---------------------------------------------------------------------------------------------------------------------------           Interval History:   Patient presents today to discuss prior MRI brain studies.       Past Medical History:     Patient Active Problem List   Diagnosis    Transient ischemic attack (TIA)    Hearing loss, unspecified hearing loss type, unspecified laterality    Gender dysphoria     Past Medical History:   Diagnosis Date    Tear meniscus knee, bilateral          Past Surgical History:     Past Surgical History:   Procedure Laterality Date    COLONOSCOPY      benign polyp removal, in [place], around     EYE SURGERY Bilateral     ICL lens implantation, in [place]    KNEE ARTHROSCOPY W/ DEBRIDEMENT Left     MARSUPIALIZATION BARTHOLIN CYST  2004    x2        Social History:     Social History     Tobacco Use    Smoking status: Former     Current packs/day: 0.00     Types: Cigarettes     Quit date: 1999     Years since quittin.0    Smokeless tobacco: Never   Vaping Use    Vaping status: Every Day    Substances: THC   Substance Use Topics    Alcohol use: Yes     Alcohol/week: 7.0 - 14.0 standard drinks of alcohol     Types: 7 - 14 Standard drinks or equivalent per week     Comment: about 1.5 drinks a day    Drug use: Not Currently     Types: Marijuana        Family History:     Family History   Problem Relation Age of Onset    Cerebrovascular Disease Father     Breast Cancer Paternal Aunt     Ovarian Cancer No family hx of     Colorectal Cancer No family hx of         Medications:     Current Outpatient Medications   Medication Sig Dispense Refill    aspirin 81 MG EC tablet Take 81 mg by mouth daily      atorvastatin (LIPITOR) 20 MG tablet Take 1 tablet (20 mg) by mouth daily 90 tablet 3    NICOTINE TD        No current facility-administered medications for this visit.        Allergies:   No Known Allergies     Review of Systems:   As above     Physical Exam:   Vitals: /79 (BP Location: Right arm, Patient Position: Sitting, Cuff Size: Adult Regular)   Pulse 103   Resp 20   SpO2 99%    General: Seated comfortably in no acute distress.  Lungs: breathing comfortably  Neurologic:     Mental Status: Fully alert, attentive. Language normal, speech clear and fluent, no paraphasic errors.     Cranial Nerves: No dysarthria.      Motor: No tremors or other abnormal movements observed.      Gait: Normal, steady casual gait.      Data reviewed on previous visits     Imaging:  At least 3 MRI brain reports reviewed (with and without contrast most recently 2/2023) commented on left sided 7.5 mm hyperintensity in the left insula / periventricular area     MRA brain report without stenosis    Reported had unremarkable carotid ultrasound    Pertinent Investigations since last visit:   Outside MRI brain imaging personally reviewed on patient's laptop. There is small left sided periventricular/insula non-specific hyperintensity (7.5 mm). There is no diffusion restriction or enhancement in the lesion. There are no other concerning lesions. MRI brain appears stable when comparing July 2021 and February 2023 scans.     The total time of this encounter today amounted to 34 minutes. This time included time spent with the patient, prep work, ordering tests, and performing post visit documentation.

## 2024-09-27 ENCOUNTER — OFFICE VISIT (OUTPATIENT)
Dept: NEUROLOGY | Facility: CLINIC | Age: 44
End: 2024-09-27
Payer: COMMERCIAL

## 2024-09-27 VITALS
SYSTOLIC BLOOD PRESSURE: 120 MMHG | OXYGEN SATURATION: 99 % | DIASTOLIC BLOOD PRESSURE: 79 MMHG | RESPIRATION RATE: 20 BRPM | HEART RATE: 103 BPM

## 2024-09-27 DIAGNOSIS — R29.90 EPISODE OF TRANSIENT NEUROLOGIC SYMPTOMS: Primary | ICD-10-CM

## 2024-09-27 PROCEDURE — 99214 OFFICE O/P EST MOD 30 MIN: CPT | Performed by: INTERNAL MEDICINE

## 2024-09-27 ASSESSMENT — PAIN SCALES - GENERAL: PAINLEVEL: NO PAIN (0)

## 2024-09-27 NOTE — NURSING NOTE
Chief Complaint   Patient presents with    RECHECK     /79 (BP Location: Right arm, Patient Position: Sitting, Cuff Size: Adult Regular)   Pulse 103   Resp 20   SpO2 99%     ROSEY MAGI

## 2024-09-27 NOTE — LETTER
9/27/2024       RE: Estefany Covington  [address]  [facility] [address]     Dear Colleague,    Thank you for referring your patient, Estefany Covington, to the [facility] [place] at [facility]. Please see a copy of my visit note below.    [facility] Neurology Follow Up Visit    Estefany Covington MRN# [id number]   Age: 43 year old YOB: 1980     Brief history of symptoms: The patient was initially seen in neurologic consultation on 9/20/2024 for evaluation of episodic neurological symptoms. Please see the comprehensive neurologic consultation note from that date in the Epic records for details.          Assessment and Plan:   Assessment:  Estefany Covington is a 43 year old adult who presents today for follow-up of episodic neurological symptoms and abnormal MRI brain. Starting in July 2021 patient has had episodes of blurry vision with likely aphasia. She previously had work-up for these episodes in [place] and was recommended to start aspirin/Lipitor for possible TIA. MRI brain was reviewed today which shows incidental small non specific left periventricular/insular hyperintensity that was stable on subsequent imaging. We discussed doing carotid ultrasound to do basic TIA work-up. Given low likelihood of TIA we can forgo TTE and cardiac monitoring at this time. If ultrasound is unremarkable patient can discontinue aspirin/Lipitor. Patient also reports family history of seizures and it would be reasonable to pursue EEG. I am most suspicious of these episodes representing complicated migraines.        Plan:  - Carotid ultrasound  - EEG 3 hours    Follow up in Neurology clinic pending above    Eliceo Smith MD   of Neurology  [facility]  ---------------------------------------------------------------------------------------------------------------------------           Interval History:   Patient presents  today to discuss prior MRI brain studies.       Past Medical History:     Patient Active Problem List   Diagnosis     Transient ischemic attack (TIA)     Hearing loss, unspecified hearing loss type, unspecified laterality     Gender dysphoria     Past Medical History:   Diagnosis Date     Tear meniscus knee, bilateral         Past Surgical History:     Past Surgical History:   Procedure Laterality Date     COLONOSCOPY      benign polyp removal, in [place], around      EYE SURGERY Bilateral     ICL lens implantation, in [place]     KNEE ARTHROSCOPY W/ DEBRIDEMENT Left      MARSUPIALIZATION BARTHOLIN CYST  2004    x2        Social History:     Social History     Tobacco Use     Smoking status: Former     Current packs/day: 0.00     Types: Cigarettes     Quit date: 1999     Years since quittin.0     Smokeless tobacco: Never   Vaping Use     Vaping status: Every Day     Substances: THC   Substance Use Topics     Alcohol use: Yes     Alcohol/week: 7.0 - 14.0 standard drinks of alcohol     Types: 7 - 14 Standard drinks or equivalent per week     Comment: about 1.5 drinks a day     Drug use: Not Currently     Types: Marijuana        Family History:     Family History   Problem Relation Age of Onset     Cerebrovascular Disease Father      Breast Cancer Paternal Aunt      Ovarian Cancer No family hx of      Colorectal Cancer No family hx of         Medications:     Current Outpatient Medications   Medication Sig Dispense Refill     aspirin 81 MG EC tablet Take 81 mg by mouth daily       atorvastatin (LIPITOR) 20 MG tablet Take 1 tablet (20 mg) by mouth daily 90 tablet 3     NICOTINE TD        No current facility-administered medications for this visit.        Allergies:   No Known Allergies     Review of Systems:   As above     Physical Exam:   Vitals: /79 (BP Location: Right arm, Patient Position: Sitting, Cuff Size: Adult Regular)   Pulse 103   Resp 20   SpO2 99%    General: Seated comfortably in no  acute distress.  Lungs: breathing comfortably  Neurologic:     Mental Status: Fully alert, attentive. Language normal, speech clear and fluent, no paraphasic errors.     Cranial Nerves: No dysarthria.      Motor: No tremors or other abnormal movements observed.      Gait: Normal, steady casual gait.      Data reviewed on previous visits    Imaging:  At least 3 MRI brain reports reviewed (with and without contrast most recently 2/2023) commented on left sided 7.5 mm hyperintensity in the left insula / periventricular area     MRA brain report without stenosis    Reported had unremarkable carotid ultrasound    Pertinent Investigations since last visit:   Outside MRI brain imaging personally reviewed on patient's laptop. There is small left sided periventricular/insula non-specific hyperintensity (7.5 mm). There is no diffusion restriction or enhancement in the lesion. There are no other concerning lesions. MRI brain appears stable when comparing July 2021 and February 2023 scans.     The total time of this encounter today amounted to 34 minutes. This time included time spent with the patient, prep work, ordering tests, and performing post visit documentation.      Again, thank you for allowing me to participate in the care of your patient.      Sincerely,    Eliceo Smith MD

## 2024-10-03 ENCOUNTER — ANCILLARY PROCEDURE (OUTPATIENT)
Dept: ULTRASOUND IMAGING | Facility: CLINIC | Age: 44
End: 2024-10-03
Attending: INTERNAL MEDICINE
Payer: COMMERCIAL

## 2024-10-03 DIAGNOSIS — R29.90 EPISODE OF TRANSIENT NEUROLOGIC SYMPTOMS: ICD-10-CM

## 2024-10-03 PROCEDURE — 93880 EXTRACRANIAL BILAT STUDY: CPT | Performed by: RADIOLOGY

## 2024-11-11 ENCOUNTER — ANCILLARY PROCEDURE (OUTPATIENT)
Dept: NEUROLOGY | Facility: CLINIC | Age: 44
End: 2024-11-11
Attending: INTERNAL MEDICINE
Payer: COMMERCIAL

## 2024-11-11 DIAGNOSIS — R29.90 EPISODE OF TRANSIENT NEUROLOGIC SYMPTOMS: ICD-10-CM

## 2025-03-10 ENCOUNTER — TELEPHONE (OUTPATIENT)
Dept: NEUROLOGY | Facility: CLINIC | Age: 45
End: 2025-03-10

## 2025-03-10 NOTE — TELEPHONE ENCOUNTER
[facility] Call Center    Phone Message    May a detailed message be left on voicemail: yes     Reason for Call: Symptoms or Concerns     If patient has red-flag symptoms, warm transfer to triage line    Current symptom or concern:   1-2 fingers on left hand fell asleep randomly   Front of tongue went numb  Blurry vision on the right eye, saw strobe/flashing lights in the peripheral vision, two different episodes     Symptoms have been present for: 1 hour(s)    Has patient previously been seen for this? No    Are there any new or worsening symptoms? Yes, mentioned above     Please call patient to discuss symptoms at [phone]     Action Taken: Message routed to:  [facility] ([facility] Neurology

## 2025-03-10 NOTE — TELEPHONE ENCOUNTER
Relayed recommendations. Pt agreeable to scheduling follow-up. Will call clinic if there are any further episodes or if there are new symptoms to go into ER. Nothing further needed.

## 2025-03-10 NOTE — TELEPHONE ENCOUNTER
Last seen in September 2024 with similar symptoms for TIA. (Numbness is new). Carotid US & EEG ordered, which both came back normal. No longer on ASA or Lipitor.     Patient reports the following:   Right eye peripheral vision- strobe/flashing lights/flickering. Duration: 10-15 minutes. Resolved on its own and not currently experiencing this. Difficulty reading due to vision. Went on [employer] to view already watched movies/shows, patient reported that they didn't recognize actors or movie titles that they have already watched. Did not recognize name of current neurologist (but this has mostly resolved now). They do not have any current slurred speech, but RN did notice some mild word-finding difficulty. Making clear sentences, just taking a little bit more time to process.     Numbness that she was experiencing in their tongue and fingers have now resolved.     Reports a headache that has been going on for about an hour, PRN Ibuprofen taken 1/2 hour ago. Rates their pain 4-5/10, with aching sensation. Not radiating anywhere and denies this being the worst headache of their life. Denies this getting more intense, but staying the same. Advised to take Tylenol as well, stay hydrated.     Accompanying Signs & Symptoms:   Fever: No  Nausea or vomiting: No  Dizziness: No  Numbness: YES, now resolved. Reports of front tongue and 1-2 fingers on left hand.   Weakness: No  Visual changes: YES, now resolved. See comments above.     No known triggers/no medication changes. Endorses adequate PO intake. Unable to monitor BP at home. Previously VSS at last visit.     Recommendatons:  - Advised to call 911/go into ER if any new symptoms occur or start to worsen  - Next visit is not until 4/11 at [facility]. Routing to Dr. Smith for further review and recommendations.   - Patient agreeable to plan, will call back ASAP.